# Patient Record
Sex: FEMALE | Race: WHITE | NOT HISPANIC OR LATINO
[De-identification: names, ages, dates, MRNs, and addresses within clinical notes are randomized per-mention and may not be internally consistent; named-entity substitution may affect disease eponyms.]

---

## 2019-01-18 ENCOUNTER — RESULT REVIEW (OUTPATIENT)
Age: 54
End: 2019-01-18

## 2019-08-06 ENCOUNTER — RX RENEWAL (OUTPATIENT)
Age: 54
End: 2019-08-06

## 2019-08-06 PROBLEM — Z00.00 ENCOUNTER FOR PREVENTIVE HEALTH EXAMINATION: Status: ACTIVE | Noted: 2019-08-06

## 2019-08-22 ENCOUNTER — RX RENEWAL (OUTPATIENT)
Age: 54
End: 2019-08-22

## 2019-09-12 ENCOUNTER — APPOINTMENT (OUTPATIENT)
Dept: ORTHOPEDIC SURGERY | Facility: CLINIC | Age: 54
End: 2019-09-12
Payer: COMMERCIAL

## 2019-09-12 VITALS — WEIGHT: 181 LBS | HEIGHT: 65.5 IN | BODY MASS INDEX: 29.79 KG/M2

## 2019-09-12 PROCEDURE — 20610 DRAIN/INJ JOINT/BURSA W/O US: CPT | Mod: LT

## 2019-09-12 PROCEDURE — 99212 OFFICE O/P EST SF 10 MIN: CPT | Mod: 25

## 2020-01-28 ENCOUNTER — APPOINTMENT (OUTPATIENT)
Dept: ORTHOPEDIC SURGERY | Facility: CLINIC | Age: 55
End: 2020-01-28
Payer: COMMERCIAL

## 2020-01-28 VITALS — BODY MASS INDEX: 30.62 KG/M2 | WEIGHT: 186 LBS | HEIGHT: 65.5 IN

## 2020-01-28 PROCEDURE — 99214 OFFICE O/P EST MOD 30 MIN: CPT

## 2020-01-28 PROCEDURE — 73030 X-RAY EXAM OF SHOULDER: CPT | Mod: RT

## 2020-01-28 PROCEDURE — 73070 X-RAY EXAM OF ELBOW: CPT | Mod: RT

## 2020-01-28 RX ORDER — VALACYCLOVIR 500 MG/1
500 TABLET, FILM COATED ORAL
Qty: 90 | Refills: 0 | Status: ACTIVE | COMMUNITY
Start: 2019-12-15

## 2020-01-29 NOTE — ASSESSMENT
[FreeTextEntry1] : 54 year old female with right rotator cuff tendinitis. She was given a printout for exercises.  She was given a prescription for Vimovo (she has taken in the past without heartburn).  She will followup in 3 weeks.  She knows to call with any questions or concerns or if her symptoms acutely worsen.

## 2020-01-29 NOTE — HISTORY OF PRESENT ILLNESS
[de-identified] : 54 year old female with right shoulder pain radiating down her arm to just proximal to her elbow. She cannot recall an inciting event.  The pain bothers her at night. She denies any numbness or tingling.  She has pain with motion of the shoulder.   She has tried NSAIDs without relief.

## 2020-01-29 NOTE — PHYSICAL EXAM
[de-identified] : General: No acute distress, conversant, well-nourished.\par Head: Normocephalic, atraumatic\par Neck: trachea midline, FROM\par Heart: normotensive and normal rate and rhythm\par Lungs: No labored breathing\par Skin: No abrasions, no rashes, no edema\par Psych: Alert and oriented to person, place and time\par Extremities: no peripheral edema or digital cyanosis\par Gait: Normal gait. Can perform tandem gait.  \par Vascular: warm and well perfused distally, palpable distal pulses\par \par Right Shoulder Exam:\par No swelling, no erythema.  \par No tenderness to palpation. \par Pain with active ROM of shoulder\par \par Positive Neer's impingement sign\par Positive Hawkin's test\par Positive Erik's test\par Good strength with shoulder ER and IR.\par \par No tenderness at bicipital groove\par Negative Speed's test\par Negative Yergson's test\par \par Negative Cross-body Adduction\par Negative Clackamas's test\par \par Right Elbow\par no tenderness to palpation\par pain in shoulder and upper arm with full extension, no pain with flexion or supination or pronation\par \par Sensation intact to light touch axillary, medial and lateral antebrachial cutaneous, median, radial and ulnar distributions\par +motor deltoid, biceps, triceps, EPL, FDP and IO\par palpable radial pulse, WWP distally [de-identified] : Right elbow AP and Lat radiographs obtained in the office today shows no fracture or dislocation.  \par \par Right shoulder radiographs obtained in the office today shows no fracture or dislocation.

## 2020-02-18 ENCOUNTER — APPOINTMENT (OUTPATIENT)
Dept: ORTHOPEDIC SURGERY | Facility: CLINIC | Age: 55
End: 2020-02-18
Payer: COMMERCIAL

## 2020-02-18 DIAGNOSIS — M25.521 PAIN IN RIGHT ELBOW: ICD-10-CM

## 2020-02-18 DIAGNOSIS — M75.81 OTHER SHOULDER LESIONS, RIGHT SHOULDER: ICD-10-CM

## 2020-02-18 PROCEDURE — 99213 OFFICE O/P EST LOW 20 MIN: CPT

## 2020-02-18 NOTE — PHYSICAL EXAM
[de-identified] : General: No acute distress, conversant, well-nourished.\par Head: Normocephalic, atraumatic\par Neck: trachea midline, FROM\par Heart: normotensive and normal rate and rhythm\par Lungs: No labored breathing\par Skin: No abrasions, no rashes, no edema\par Psych: Alert and oriented to person, place and time\par Extremities: no peripheral edema or digital cyanosis\par Gait: Normal gait. Can perform tandem gait.  \par Vascular: warm and well perfused distally, palpable distal pulses\par \par Right Shoulder Exam:\par No swelling, no erythema.  \par No tenderness to palpation. \par No pain with active ROM\par \par Negative Neer's impingement sign\par Negative Hawkin's test\par Negative Erik's test\par Good strength with shoulder ER and IR.\par \par No tenderness at bicipital groove\par Negative Speed's test\par Negative Yergson's test\par \par Negative Cross-body Adduction\par Negative Haakon's test\par \par Sensation intact to light touch axillary, medial and lateral antebrachial cutaneous, median, radial and ulnar distributions\par +motor deltoid, biceps, triceps, EPL, FDP and IO\par palpable radial pulse, WWP distally

## 2020-02-18 NOTE — ASSESSMENT
[FreeTextEntry1] : 54 year old female returns for followup for R shoulder and upper arm pain.  Her pain has completely resolved.  She is happy with her result.  She is off pain medication.  She will followup as needed.  She knows to call with any questions or concerns or if her symptoms acutely worsen.

## 2020-02-18 NOTE — HISTORY OF PRESENT ILLNESS
[de-identified] : 54 year old female returns for followup for Right shoulder and upper arm pain.  She says the pain has resolved. She still has some stiffness in that shoulder but she says that is the baseline for her shoulder.  She is off the anti-inflammatory medication.  She is happy with her progress.

## 2020-05-20 ENCOUNTER — RESULT REVIEW (OUTPATIENT)
Age: 55
End: 2020-05-20

## 2021-01-11 ENCOUNTER — APPOINTMENT (OUTPATIENT)
Dept: NEUROLOGY | Facility: CLINIC | Age: 56
End: 2021-01-11
Payer: COMMERCIAL

## 2021-01-11 VITALS
SYSTOLIC BLOOD PRESSURE: 106 MMHG | TEMPERATURE: 97.5 F | DIASTOLIC BLOOD PRESSURE: 72 MMHG | HEIGHT: 65.5 IN | WEIGHT: 183 LBS | HEART RATE: 65 BPM | BODY MASS INDEX: 30.12 KG/M2

## 2021-01-11 DIAGNOSIS — Z78.9 OTHER SPECIFIED HEALTH STATUS: ICD-10-CM

## 2021-01-11 DIAGNOSIS — Z86.59 PERSONAL HISTORY OF OTHER MENTAL AND BEHAVIORAL DISORDERS: ICD-10-CM

## 2021-01-11 DIAGNOSIS — R29.6 REPEATED FALLS: ICD-10-CM

## 2021-01-11 PROCEDURE — 99204 OFFICE O/P NEW MOD 45 MIN: CPT

## 2021-01-11 PROCEDURE — 99072 ADDL SUPL MATRL&STAF TM PHE: CPT

## 2021-01-11 RX ORDER — DIAZEPAM 5 MG/1
5 TABLET ORAL
Qty: 60 | Refills: 0 | Status: ACTIVE | COMMUNITY
Start: 2020-12-14

## 2021-01-11 NOTE — HISTORY OF PRESENT ILLNESS
[FreeTextEntry1] : This is a 55-year-old right-handed woman who is being seen in neurologic consultation or complaints of migraines as well as cognitive issues.\par Patient has had migraines for the last 20 years. Initially they started out as auras without headache. Typically she describes seeing a flashing "crown" in her visual field in either or both eyes. About 15-20 minutes later she will have a headache. The headache can last all day but can also stay for 2-3 days. She has tried extra strength Tylenol. In the past she has tried butalbital which seems to be more helpful than Tylenol. She cannot tolerate ibuprofen or naproxen because it bothers her stomach. In the past Imitrex caused her to have chest tightness and jaw tightness.\par Triggers include red wine. Often times she can have a migraine after a prolonged period of stress- "let down" type of migraines. She does not have migraines regularly. However, she can have migraine episodes that will be more frequent during a set period of time.\par She does have chronic tension headaches. These are different from her migraines. Associated neck and shoulder tension is noted. In the past that was triggered by chewing, and grinding her teeth.\par \par She has been noticing that she forgets names of people she works with. This is embarrassing for her. She is an . At work no one has noticed that she has made any errors. Long-term memory is intact. Executive decision-making is intact. She does not get lost when she drives.\par \par Since she has been wearing a mask she notices that she has had several falls. She feels that her glasses did not stick well on her nose because of the mask and this can lead to the falls. She has no neck pain. No bowel or bladder incontinence. No numbness or weakness in her legs.\par \par She may have interstitial cystitis and notes a lot of bladder spasms and the need to wake up frequently to go to the bathroom at night. She has to see a urologist for this.\par \par She says she sleeps well overall but this may not be consistent. She does drink a lot of water. Has been focusing on her diet.\par \par Her cardiologist obtain MRA of the head which is normal. There was some concern about fibromuscular dysplasia.

## 2021-01-11 NOTE — CONSULT LETTER
[Dear  ___] : Dear  [unfilled], [Consult Letter:] : I had the pleasure of evaluating your patient, [unfilled]. [Please see my note below.] : Please see my note below. [FreeTextEntry3] : Sincerely,\par \par Karli Witt M.D.\par

## 2021-01-11 NOTE — PHYSICAL EXAM
[FreeTextEntry1] : Physical examination \par General: No acute distress, Awake, Alert. \par Fundus: Unable\par Neck: no Carotid bruit. \par Cardiovascular: Normal rate, Regular rhythm, No murmur. \par Chest - clear bilaterally\par \par Mental status \par Awake, alert, and oriented to person, time and place, Normal attention span and concentration, Recent and remote memory intact, Language intact, Fund of knowledge intact. \par Cranial Nerves \par II: VFF \par III, IV, VI: PERRL, EOMI. \par V: Facial sensation is normal B/L. \par VII: Facial strength is normal B/L. \par VIII: Gross hearing is intact. \par IX, X: Palate is midline and elevates symmetrically. \par XI: Trapezius normal strength. \par XII: Tongue midline without atrophy or fasciculations. \par \par Motor exam \par Muscle tone - no evidence of rigidity or resistance in all 4 extremities. \par No atrophy or fasciculations \par Muscle Strength: arms and legs, proximal and distal flexors and extensors are normal \par No UE drift.\par \par Reflexes \par All present, normal, and symmetrical. \par \par Coordination \par Finger to nose: Normal. \par Heel to shin: Normal. \par \par Sensory \par Intact sensation to vibration and cold.\par \par Gait \par Normal\par

## 2021-01-11 NOTE — ASSESSMENT
[FreeTextEntry1] : Neurologic examination is normal. However, she is having increased number of headaches along with falls. I will get MRI of the brain noncontrast to evaluate for any structural abnormality. She is also having problems with memory specifically recalling names. She will get a neuropsychological evaluation.\par She is willing to try low-dose amitriptyline to help her with her migraines and tension-type headaches. This will also help her sleep better. There have been reports of amitriptyline helping with bladder spasms and interstitial cystitis.\par She notes perhaps a mild problem with hearing but we will address this at followup. We may need to consider an ideology evaluation.

## 2021-02-11 ENCOUNTER — RESULT REVIEW (OUTPATIENT)
Age: 56
End: 2021-02-11

## 2021-03-15 ENCOUNTER — APPOINTMENT (OUTPATIENT)
Dept: NEUROLOGY | Facility: CLINIC | Age: 56
End: 2021-03-15
Payer: COMMERCIAL

## 2021-03-15 VITALS
HEART RATE: 70 BPM | WEIGHT: 185 LBS | BODY MASS INDEX: 30.45 KG/M2 | SYSTOLIC BLOOD PRESSURE: 111 MMHG | DIASTOLIC BLOOD PRESSURE: 79 MMHG | TEMPERATURE: 97.3 F | HEIGHT: 65.5 IN

## 2021-03-15 PROCEDURE — 99072 ADDL SUPL MATRL&STAF TM PHE: CPT

## 2021-03-15 PROCEDURE — 99214 OFFICE O/P EST MOD 30 MIN: CPT

## 2021-03-15 RX ORDER — HYALURONATE SOD, CROSS-LINKED 30 MG/3 ML
30 SYRINGE (ML) INTRAARTICULAR
Qty: 1 | Refills: 0 | Status: DISCONTINUED | OUTPATIENT
Start: 2019-08-22 | End: 2021-03-15

## 2021-03-15 RX ORDER — HYALURONATE SODIUM, STABILIZED 88 MG/4 ML
88 SYRINGE (ML) INTRAARTICULAR
Qty: 1 | Refills: 0 | Status: DISCONTINUED | OUTPATIENT
Start: 2020-04-06 | End: 2021-03-15

## 2021-03-15 RX ORDER — NAPROXEN AND ESOMEPRAZOLE MAGNESIUM 375; 20 MG/1; MG/1
375-20 TABLET, DELAYED RELEASE ORAL TWICE DAILY
Qty: 60 | Refills: 0 | Status: DISCONTINUED | COMMUNITY
Start: 2020-01-28 | End: 2021-03-15

## 2021-03-15 NOTE — HISTORY OF PRESENT ILLNESS
[FreeTextEntry1] : 3/15/21\par Patient is here in followup. She is reporting improvement in her tension-type headache closely. Since the last visit she thinks she has had maybe 5 migraines. The amitriptyline has also helped with her bladder spasms. For the breakthrough migraines she has taken extra strength Tylenol and had some left over butalbital.\par \par In the past she has tried Imitrex and Maxalt which causes palpitations and chest tightness. She is very uncomfortable and does not want to use these medications.\par \jenny Continues to have memory issues. She is a  and notes no issues with her work. Did not do memory test.\par \par 1/11/21\par This is a 55-year-old right-handed woman who is being seen in neurologic consultation or complaints of migraines as well as cognitive issues.\par Patient has had migraines for the last 20 years. Initially they started out as auras without headache. Typically she describes seeing a flashing "crown" in her visual field in either or both eyes. About 15-20 minutes later she will have a headache. The headache can last all day but can also stay for 2-3 days. She has tried extra strength Tylenol. In the past she has tried butalbital which seems to be more helpful than Tylenol. She cannot tolerate ibuprofen or naproxen because it bothers her stomach. In the past Imitrex caused her to have chest tightness and jaw tightness.\par Triggers include red wine. Often times she can have a migraine after a prolonged period of stress- "let down" type of migraines. She does not have migraines regularly. However, she can have migraine episodes that will be more frequent during a set period of time.\par She does have chronic tension headaches. These are different from her migraines. Associated neck and shoulder tension is noted. In the past that was triggered by chewing, and grinding her teeth.\par \par She has been noticing that she forgets names of people she works with. This is embarrassing for her. She is an . At work no one has noticed that she has made any errors. Long-term memory is intact. Executive decision-making is intact. She does not get lost when she drives.\par \par Since she has been wearing a mask she notices that she has had several falls. She feels that her glasses did not stick well on her nose because of the mask and this can lead to the falls. She has no neck pain. No bowel or bladder incontinence. No numbness or weakness in her legs.\par \par She may have interstitial cystitis and notes a lot of bladder spasms and the need to wake up frequently to go to the bathroom at night. She has to see a urologist for this.\par \par She says she sleeps well overall but this may not be consistent. She does drink a lot of water. Has been focusing on her diet.\par \par Her cardiologist obtain MRA of the head which is normal. There was some concern about fibromuscular dysplasia.

## 2021-03-15 NOTE — PHYSICAL EXAM
[FreeTextEntry1] : Physical examination \par General: No acute distress, Awake, Alert. \par Cardiovascular: Normal rate, Regular rhythm, No murmur. \par Chest - clear bilaterally\par \par Mental status \par Awake, alert, and oriented to person, time and place, Normal attention span and concentration, Recent and remote memory intact, Language intact, Fund of knowledge intact. \par Cranial Nerves \par II: VFF \par III, IV, VI: PERRL, EOMI. \par V: Facial sensation is normal B/L. \par VII: Facial strength is normal B/L. \par VIII: Gross hearing is intact. \par IX, X: Palate is midline and elevates symmetrically. \par XI: Trapezius normal strength. \par XII: Tongue midline without atrophy or fasciculations. \par \par Motor exam \par Muscle tone - no evidence of rigidity or resistance in all 4 extremities. \par No atrophy or fasciculations \par Muscle Strength: arms and legs, proximal and distal flexors and extensors are normal \par No UE drift.\par \par Reflexes \par All present, normal, and symmetrical. \par \par Gait \par Normal\par

## 2021-03-15 NOTE — REASON FOR VISIT
[Follow-Up: _____] : a [unfilled] follow-up visit [Consultation] : a consultation visit [FreeTextEntry1] : Migrans

## 2021-03-15 NOTE — ASSESSMENT
[FreeTextEntry1] : Neurologic examination remains normal. Her response to amitriptyline is encouraging. I will increase this to 30 mg over the next 2 weeks.\par Discussed side effects of dry mouth dry eyes.\par For breakthrough pain, she will try Ubrelvy.\par Reviewed MRI of the brain results with her in detail.\par She has not gotten her a neuro-psychological assessment and will do so now.\par Asked her to get me MRA results from other doctor (?fibromuscular dysplasia)\par \par She will keep a headache diary.\par \par Followup in 3 months.

## 2021-06-10 ENCOUNTER — RESULT REVIEW (OUTPATIENT)
Age: 56
End: 2021-06-10

## 2021-06-21 DIAGNOSIS — M17.12 UNILATERAL PRIMARY OSTEOARTHRITIS, LEFT KNEE: ICD-10-CM

## 2021-08-24 ENCOUNTER — APPOINTMENT (OUTPATIENT)
Dept: NEUROLOGY | Facility: CLINIC | Age: 56
End: 2021-08-24
Payer: COMMERCIAL

## 2021-08-24 VITALS
WEIGHT: 186 LBS | HEIGHT: 65.5 IN | SYSTOLIC BLOOD PRESSURE: 113 MMHG | HEART RATE: 59 BPM | DIASTOLIC BLOOD PRESSURE: 79 MMHG | TEMPERATURE: 97.2 F | BODY MASS INDEX: 30.62 KG/M2

## 2021-08-24 PROCEDURE — 99214 OFFICE O/P EST MOD 30 MIN: CPT

## 2021-08-24 NOTE — ASSESSMENT
[FreeTextEntry1] : Neurologic examination remains normal.\par I will switch her to nortriptyline to see if she has less cognitive side effects.\par She will continue Ubrelvy for the breakthrough pain.\par \par She still has not gotten her neuropsychological assessment.\par \par Begin Migrelief\par \par MRA report still needed\par \par She will keep a headache diary.\par \par Followup in 3 months.

## 2021-08-24 NOTE — HISTORY OF PRESENT ILLNESS
[FreeTextEntry1] : 8/24/21\par Here in follow-up.  Had increased amitriptyline up to 30 mg.  She decreased it further because she was too sleepy and cognitively impaired.  Currently she is taking 5 mg a day.  It was helping her bladder spasms as well as headaches.  At present she is afraid to increase it because of the cognitive problems.  She is really happy with Ubrelvy as there are no side effects and it works most of the time.  She is having some increased number of headaches since she stopped the amitriptyline.  She brought a headache diary.  At present her approximate headache log is about 15 headaches since last visit.\par \par 3/15/21\par Patient is here in followup. She is reporting improvement in her tension-type headache closely. Since the last visit she thinks she has had maybe 5 migraines. The amitriptyline has also helped with her bladder spasms. For the breakthrough migraines she has taken extra strength Tylenol and had some left over butalbital.\par \par In the past she has tried Imitrex and Maxalt which causes palpitations and chest tightness. She is very uncomfortable and does not want to use these medications.\par \jenny Continues to have memory issues. She is a  and notes no issues with her work. Did not do memory test.\par \par 1/11/21\par This is a 55-year-old right-handed woman who is being seen in neurologic consultation or complaints of migraines as well as cognitive issues.\par Patient has had migraines for the last 20 years. Initially they started out as auras without headache. Typically she describes seeing a flashing "crown" in her visual field in either or both eyes. About 15-20 minutes later she will have a headache. The headache can last all day but can also stay for 2-3 days. She has tried extra strength Tylenol. In the past she has tried butalbital which seems to be more helpful than Tylenol. She cannot tolerate ibuprofen or naproxen because it bothers her stomach. In the past Imitrex caused her to have chest tightness and jaw tightness.\par Triggers include red wine. Often times she can have a migraine after a prolonged period of stress- "let down" type of migraines. She does not have migraines regularly. However, she can have migraine episodes that will be more frequent during a set period of time.\par She does have chronic tension headaches. These are different from her migraines. Associated neck and shoulder tension is noted. In the past that was triggered by chewing, and grinding her teeth.\par \par She has been noticing that she forgets names of people she works with. This is embarrassing for her. She is an . At work no one has noticed that she has made any errors. Long-term memory is intact. Executive decision-making is intact. She does not get lost when she drives.\par \par Since she has been wearing a mask she notices that she has had several falls. She feels that her glasses did not stick well on her nose because of the mask and this can lead to the falls. She has no neck pain. No bowel or bladder incontinence. No numbness or weakness in her legs.\par \par She may have interstitial cystitis and notes a lot of bladder spasms and the need to wake up frequently to go to the bathroom at night. She has to see a urologist for this.\par \par She says she sleeps well overall but this may not be consistent. She does drink a lot of water. Has been focusing on her diet.\par \par Her cardiologist obtain MRA of the head which is normal. There was some concern about fibromuscular dysplasia.

## 2021-12-14 ENCOUNTER — APPOINTMENT (OUTPATIENT)
Dept: NEUROLOGY | Facility: CLINIC | Age: 56
End: 2021-12-14
Payer: COMMERCIAL

## 2021-12-14 VITALS — DIASTOLIC BLOOD PRESSURE: 76 MMHG | HEART RATE: 63 BPM | SYSTOLIC BLOOD PRESSURE: 116 MMHG

## 2021-12-14 DIAGNOSIS — M25.522 PAIN IN LEFT ELBOW: ICD-10-CM

## 2021-12-14 PROCEDURE — 99215 OFFICE O/P EST HI 40 MIN: CPT

## 2021-12-14 RX ORDER — AMITRIPTYLINE HYDROCHLORIDE 10 MG/1
10 TABLET, FILM COATED ORAL
Qty: 90 | Refills: 3 | Status: DISCONTINUED | COMMUNITY
Start: 2021-01-11 | End: 2021-12-14

## 2021-12-14 RX ORDER — FAMOTIDINE 10 MG/1
TABLET, FILM COATED ORAL
Refills: 0 | Status: ACTIVE | COMMUNITY

## 2021-12-14 RX ORDER — HYALURONATE SODIUM, STABILIZED 88 MG/4 ML
88 SYRINGE (ML) INTRAARTICULAR
Qty: 1 | Refills: 0 | Status: DISCONTINUED | OUTPATIENT
Start: 2019-08-06 | End: 2021-12-14

## 2021-12-14 RX ORDER — HYALURONATE SODIUM, STABILIZED 88 MG/4 ML
88 SYRINGE (ML) INTRAARTICULAR
Qty: 1 | Refills: 0 | Status: DISCONTINUED | OUTPATIENT
Start: 2021-06-16 | End: 2021-12-14

## 2021-12-14 RX ORDER — HYALURONATE SODIUM, STABILIZED 88 MG/4 ML
88 SYRINGE (ML) INTRAARTICULAR
Qty: 1 | Refills: 0 | Status: DISCONTINUED | OUTPATIENT
Start: 2021-06-21 | End: 2021-12-14

## 2021-12-14 RX ORDER — BACILLUS COAGULANS/INULIN 1B-250 MG
CAPSULE ORAL
Refills: 0 | Status: ACTIVE | COMMUNITY

## 2021-12-14 NOTE — PHYSICAL EXAM
[FreeTextEntry1] : Physical examination \par General: No acute distress, Awake, Alert. \par  other: trapezius spasm.\par Negative Lhermitte's sign.\par \par Mental status \par Awake, alert, gives detailed history. \par Cranial Nerves \par II: VFF \par III, IV, VI: PERRL, EOMI. \par V: Facial sensation is normal B/L. \par VII: Facial strength is normal B/L. \par VIII: Gross hearing is intact. \par IX, X: Palate is midline and elevates symmetrically. \par XI: Trapezius normal strength. \par XII: Tongue midline without atrophy or fasciculations. \par \par Motor exam \par Muscle tone - no evidence of rigidity or resistance in all 4 extremities. \par No atrophy or fasciculations \par Muscle Strength: arms and legs, proximal and distal flexors and extensors are normal \par No UE drift.\par \par Reflexes \par Diffuse hyperreflexia. \par \par Sensory:\par Positive Tinels B.\par Decreased PP sparing the fourth and fifth digit B. \par \par Gait \par Normal\par

## 2021-12-14 NOTE — CONSULT LETTER
[Dear  ___] : Dear  [unfilled], [Consult Letter:] : I had the pleasure of evaluating your patient, [unfilled]. [Please see my note below.] : Please see my note below. [Consult Closing:] : Thank you very much for allowing me to participate in the care of this patient.  If you have any questions, please do not hesitate to contact me. [Sincerely,] : Sincerely, [FreeTextEntry3] : Sincerely,\par Harriet Lind NBrainP.\par \par

## 2021-12-14 NOTE — HISTORY OF PRESENT ILLNESS
[FreeTextEntry1] : Kathe Ahumada is a 56 year old woman presenting for a follow up appointment for migraines and tension type headaches. \par \par She has not had any more than 3 headache days per month. no migraines in past month. She took Ubrelvy 50mg with repeat of one dose at times and it works effectively. Denies nausea. Has aura of "crown" then headache in various areas afterward (unchanged from previously).\par \par Taking Nortriptyline 10mg - is having a BM a few times per week. Have not tried magnesium oxide yet as per PCP. She is taking magnesium citrate with calcium. \par Increasing irritable past few weeks possibly due to stress. She didn't get neuropsychological testing yet. She could not remember remembering conversation at work. \par In the last 2 months could not recall conversations at work at times. She can pay her bills-forget at times, make lists for grocery shopping. Did not have neuropsychological testing. \par \par She endorses a transient headache behind her right eye that started on November 9th. This lasted a few minutes. She had increase stress with her family at that time. \par She reports her headache started with her head flexing neck back and has tightness in her neck.  Declined PT. \par \par Feels generalized weak and fatigued-takes nortriptyline in AM.\par \par She has a torn meniscus. Was taking NSAIDS extra sensitiv seeing GI, esophageal discomfort. \par \par Left wrist pain radiating into thumb and fingers. At times she has elbow pain radiating down to wrist and fourth finger. aching that started two weeks ago. Denies any injury. Hands more clumsy B. Denies weakness. \par \par Denies back pain radiating down legs. \par \par The remaining neurological review of systems is negative. \par \par 3/15/21\par Patient is here in followup. She is reporting improvement in her tension-type headache closely. Since the last visit she thinks she has had maybe 5 migraines. The amitriptyline has also helped with her bladder spasms. For the breakthrough migraines she has taken extra strength Tylenol and had some left over butalbital.\par \par In the past she has tried Imitrex and Maxalt which causes palpitations and chest tightness. She is very uncomfortable and does not want to use these medications.\par \par Continues to have memory issues. She is a  and notes no issues with her work. Did not do memory test.\par \par 1/11/21\par This is a 55-year-old right-handed woman who is being seen in neurologic consultation or complaints of migraines as well as cognitive issues.\par Patient has had migraines for the last 20 years. Initially they started out as auras without headache. Typically she describes seeing a flashing "crown" in her visual field in either or both eyes. About 15-20 minutes later she will have a headache. The headache can last all day but can also stay for 2-3 days. She has tried extra strength Tylenol. In the past she has tried butalbital which seems to be more helpful than Tylenol. She cannot tolerate ibuprofen or naproxen because it bothers her stomach. In the past Imitrex caused her to have chest tightness and jaw tightness.\par Triggers include red wine. Often times she can have a migraine after a prolonged period of stress- "let down" type of migraines. She does not have migraines regularly. However, she can have migraine episodes that will be more frequent during a set period of time.\par She does have chronic tension headaches. These are different from her migraines. Associated neck and shoulder tension is noted. In the past that was triggered by chewing, and grinding her teeth.\par \par She has been noticing that she forgets names of people she works with. This is embarrassing for her. She is an . At work no one has noticed that she has made any errors. Long-term memory is intact. Executive decision-making is intact. She does not get lost when she drives.\par \par Since she has been wearing a mask she notices that she has had several falls. She feels that her glasses did not stick well on her nose because of the mask and this can lead to the falls. She has no neck pain. No bowel or bladder incontinence. No numbness or weakness in her legs.\par \par She may have interstitial cystitis and notes a lot of bladder spasms and the need to wake up frequently to go to the bathroom at night. She has to see a urologist for this.\par \par She says she sleeps well overall but this may not be consistent. She does drink a lot of water. Has been focusing on her diet.\par \par Her cardiologist obtain MRA of the head which is normal. There was some concern about fibromuscular dysplasia.

## 2021-12-14 NOTE — ASSESSMENT
[FreeTextEntry1] : Kathe Ahumada is a 56 year old woman with multiple issues. \par \par Migraine with aura and chronic tension type headaches. \par Continue Nortriptyline 10mg. Trial of switching dose to bedtime to see if fatigue and generalized weakness improves.  Add bowel regimen as per PCP. \par Discussed side effects of dry mouth dry eyes.\par Daytime fatigue, snoring, and cognitive changes- Home sleep apnea test. \par Hyperreflexia and normal neurological exam- consider MRI cervical spine next visit if exam or symptoms change.\par For breakthrough pain, she will continue Ubrelvy.\par Reviewed MRI of the brain results with her in detail.\par Keep headache diary.\par Increase water intake. \par \par Nonradiating neck pain- apply heat to neck 20 minutes three times daily.\par \par Cognitive complaints. \par She has not gotten her a neuro-psychological assessment and will do so now (order placed again).\par Asked her to get me MRA results from other doctor (?fibromuscular dysplasia)\par \par Paresthesias in hands and left elbow pain. \par Probable carpal tunnel syndrome and possible left ulnar neuropathy.\par Start OT. \par EMG arms.  \par \par Follow up in 3 months.\par \par Case and management discussed with Dr. Witt. \par \par I discussed in detail with the patient the diagnosis, prognosis, treatment plan and answered all of her questions.\par \par

## 2021-12-14 NOTE — DATA REVIEWED
[de-identified] : 2/11/21\par IMPRESSION:\par \par  No acute intracranial hemorrhage, acute infarction, extra-axial fluid collection or hydrocephalus.\par  Mild subcortical foci of white matter T2 weighted hyperintensity with a frontal lobe predominance,\par nonspecific in this age demographic may reflect sequela of mild chronic microvascular ischemia\par versus sequela of chronic migraine.\par

## 2021-12-20 ENCOUNTER — RX RENEWAL (OUTPATIENT)
Age: 56
End: 2021-12-20

## 2022-02-07 NOTE — REASON FOR VISIT
[Follow-Up: _____] : a [unfilled] follow-up visit [Consultation] : a consultation visit Detail Level: Detailed [FreeTextEntry1] : Migrans

## 2022-03-10 ENCOUNTER — APPOINTMENT (OUTPATIENT)
Dept: NEUROLOGY | Facility: CLINIC | Age: 57
End: 2022-03-10

## 2022-04-12 ENCOUNTER — RX RENEWAL (OUTPATIENT)
Age: 57
End: 2022-04-12

## 2022-05-03 ENCOUNTER — RESULT REVIEW (OUTPATIENT)
Age: 57
End: 2022-05-03

## 2022-06-01 ENCOUNTER — APPOINTMENT (OUTPATIENT)
Dept: NEUROLOGY | Facility: CLINIC | Age: 57
End: 2022-06-01
Payer: COMMERCIAL

## 2022-06-01 VITALS
HEIGHT: 66 IN | WEIGHT: 181 LBS | BODY MASS INDEX: 29.09 KG/M2 | SYSTOLIC BLOOD PRESSURE: 121 MMHG | OXYGEN SATURATION: 98 % | HEART RATE: 69 BPM | DIASTOLIC BLOOD PRESSURE: 82 MMHG | TEMPERATURE: 97.8 F

## 2022-06-01 DIAGNOSIS — G44.221 CHRONIC TENSION-TYPE HEADACHE, INTRACTABLE: ICD-10-CM

## 2022-06-01 DIAGNOSIS — R20.2 PARESTHESIA OF SKIN: ICD-10-CM

## 2022-06-01 PROCEDURE — 99214 OFFICE O/P EST MOD 30 MIN: CPT

## 2022-06-01 RX ORDER — COLLAGENASE CLOSTRIDIUM HIST.
POWDER (EA) MISCELLANEOUS
Refills: 0 | Status: DISCONTINUED | COMMUNITY
End: 2022-06-01

## 2022-06-02 NOTE — CONSULT LETTER
[Dear  ___] : Dear  [unfilled], [Consult Letter:] : I had the pleasure of evaluating your patient, [unfilled]. [Please see my note below.] : Please see my note below. [Consult Closing:] : Thank you very much for allowing me to participate in the care of this patient.  If you have any questions, please do not hesitate to contact me. [FreeTextEntry3] : Sincerely,\par \par Karli Witt M.D.\par \par

## 2022-06-02 NOTE — DATA REVIEWED
[de-identified] : 2/11/21\par IMPRESSION:\par \par  No acute intracranial hemorrhage, acute infarction, extra-axial fluid collection or hydrocephalus.\par  Mild subcortical foci of white matter T2 weighted hyperintensity with a frontal lobe predominance,\par nonspecific in this age demographic may reflect sequela of mild chronic microvascular ischemia\par versus sequela of chronic migraine.\par

## 2022-06-02 NOTE — ASSESSMENT
[FreeTextEntry1] : Kathe Ahumada is a 56 year old woman with multiple issues. \par \par Migraine with aura and chronic tension type headaches. \par Continue Nortriptyline 10mg.  \par \par Daytime fatigue, snoring, and cognitive changes- does not think she needs home sleep apnea test so not scheduling\par \par For breakthrough pain, she will continue Ubrelvy.\par \par Keep headache diary.\par Increase water intake. \par \par Nonradiating neck pain- apply heat to neck 20 minutes three times daily.\par \par Cognitive complaints. \par She has not gotten her a neuro-psychological assessment.\par \par \par Paresthesias in hands and left elbow pain. \par Probable carpal tunnel syndrome and possible left ulnar neuropathy.\par Cancelled EMG\par \par Asked her to get me MRA results from other doctor (?fibromuscular dysplasia)\par \par Follow up in 1 year\par \par

## 2022-06-02 NOTE — HISTORY OF PRESENT ILLNESS
[FreeTextEntry1] : Kathe is here in f/u. She notes that headaches are overall well controlled.\par Migraines are well controlled along with smaller tension headaches are well controlled.\par \par Chocolate, red wine, and blue cheese triggered a migraine this past week. Ubrelvy does work- she has to take one or two. Has not needed in several months.\par \par Weather can trigger sinus headaches.\par \par Feels that she does not need sleep study.\par did not do neuropsychological evaluation.\par \par She takes Nortriptyline in the am as at night it keeps her awake. Has some fatigue but not sure if its from Nortriptyline. Notes constipation is worse from baseline. Has f/u with PCP soon.\par \par Feels memory is better. \par Left wrist pain resolved- no more numbness or pain in the left hand-  Cancelled EMG as well. Thinks its overuse and weight lifting during work outs.\par \par Occasionally has split second episode of the environment shifts or "black out." No hearing loss or tinnitus. no chest pain or sob. Vision is fine. \par \par No significant neck pain\par Working from home helps.\par  \par 12/14/21\par Kathe Ahumada is a 56 year old woman presenting for a follow up appointment for migraines and tension type headaches. \par \par She has not had any more than 3 headache days per month. no migraines in past month. She took Ubrelvy 50mg with repeat of one dose at times and it works effectively. Denies nausea. Has aura of "crown" then headache in various areas afterward (unchanged from previously).\par \par Taking Nortriptyline 10mg - is having a BM a few times per week. Have not tried magnesium oxide yet as per PCP. She is taking magnesium citrate with calcium. \par Increasing irritable past few weeks possibly due to stress. She didn't get neuropsychological testing yet. She could not remember remembering conversation at work. \par In the last 2 months could not recall conversations at work at times. She can pay her bills-forget at times, make lists for grocery shopping. Did not have neuropsychological testing. \par \par She endorses a transient headache behind her right eye that started on November 9th. This lasted a few minutes. She had increase stress with her family at that time. \par She reports her headache started with her head flexing neck back and has tightness in her neck.  Declined PT. \par \par Feels generalized weak and fatigued-takes nortriptyline in AM.\par \par She has a torn meniscus. Was taking NSAIDS extra sensitiv seeing GI, esophageal discomfort. \par \par Left wrist pain radiating into thumb and fingers. At times she has elbow pain radiating down to wrist and fourth finger. aching that started two weeks ago. Denies any injury. Hands more clumsy B. Denies weakness. \par \par Denies back pain radiating down legs. \par \par The remaining neurological review of systems is negative. \par \par 3/15/21\par Patient is here in followup. She is reporting improvement in her tension-type headache closely. Since the last visit she thinks she has had maybe 5 migraines. The amitriptyline has also helped with her bladder spasms. For the breakthrough migraines she has taken extra strength Tylenol and had some left over butalbital.\par \par In the past she has tried Imitrex and Maxalt which causes palpitations and chest tightness. She is very uncomfortable and does not want to use these medications.\par \par Continues to have memory issues. She is a  and notes no issues with her work. Did not do memory test.\par \par 1/11/21\par This is a 55-year-old right-handed woman who is being seen in neurologic consultation or complaints of migraines as well as cognitive issues.\par Patient has had migraines for the last 20 years. Initially they started out as auras without headache. Typically she describes seeing a flashing "crown" in her visual field in either or both eyes. About 15-20 minutes later she will have a headache. The headache can last all day but can also stay for 2-3 days. She has tried extra strength Tylenol. In the past she has tried butalbital which seems to be more helpful than Tylenol. She cannot tolerate ibuprofen or naproxen because it bothers her stomach. In the past Imitrex caused her to have chest tightness and jaw tightness.\par Triggers include red wine. Often times she can have a migraine after a prolonged period of stress- "let down" type of migraines. She does not have migraines regularly. However, she can have migraine episodes that will be more frequent during a set period of time.\par She does have chronic tension headaches. These are different from her migraines. Associated neck and shoulder tension is noted. In the past that was triggered by chewing, and grinding her teeth.\par \par She has been noticing that she forgets names of people she works with. This is embarrassing for her. She is an . At work no one has noticed that she has made any errors. Long-term memory is intact. Executive decision-making is intact. She does not get lost when she drives.\par \par Since she has been wearing a mask she notices that she has had several falls. She feels that her glasses did not stick well on her nose because of the mask and this can lead to the falls. She has no neck pain. No bowel or bladder incontinence. No numbness or weakness in her legs.\par \par She may have interstitial cystitis and notes a lot of bladder spasms and the need to wake up frequently to go to the bathroom at night. She has to see a urologist for this.\par \par She says she sleeps well overall but this may not be consistent. She does drink a lot of water. Has been focusing on her diet.\par \par Her cardiologist obtain MRA of the head which is normal. There was some concern about fibromuscular dysplasia.

## 2022-06-02 NOTE — PHYSICAL EXAM
[FreeTextEntry1] : Physical examination \par General: No acute distress, Awake, Alert. \par  other: trapezius spasm\par \par Mental status \par Awake, alert, gives detailed history. \par Cranial Nerves \par II: VFF \par III, IV, VI: PERRL, EOMI. \par V: Facial sensation is normal B/L. \par VII: Facial strength is normal B/L. \par VIII: Gross hearing is intact. \par IX, X: Palate is midline and elevates symmetrically. \par XI: Trapezius normal strength. \par XII: Tongue midline without atrophy or fasciculations. \par \par Motor exam \par Muscle tone - no evidence of rigidity or resistance in all 4 extremities. \par No atrophy or fasciculations \par Muscle Strength: arms and legs, proximal and distal flexors and extensors are normal \par No UE drift.\par \par Reflexes \par Normal\par \par Sensory:\par Positive Tinels B.\par Decreased PP sparing the fourth and fifth digit B. \par \par Gait \par Normal\par

## 2024-03-11 ENCOUNTER — APPOINTMENT (OUTPATIENT)
Dept: NEUROLOGY | Facility: CLINIC | Age: 59
End: 2024-03-11
Payer: COMMERCIAL

## 2024-03-11 VITALS
DIASTOLIC BLOOD PRESSURE: 76 MMHG | BODY MASS INDEX: 28.12 KG/M2 | HEART RATE: 65 BPM | SYSTOLIC BLOOD PRESSURE: 111 MMHG | HEIGHT: 66 IN | OXYGEN SATURATION: 96 % | WEIGHT: 175 LBS

## 2024-03-11 DIAGNOSIS — R42 DIZZINESS AND GIDDINESS: ICD-10-CM

## 2024-03-11 DIAGNOSIS — R41.9 UNSPECIFIED SYMPTOMS AND SIGNS INVOLVING COGNITIVE FUNCTIONS AND AWARENESS: ICD-10-CM

## 2024-03-11 DIAGNOSIS — G43.109 MIGRAINE WITH AURA, NOT INTRACTABLE, W/OUT STATUS MIGRAINOSUS: ICD-10-CM

## 2024-03-11 PROCEDURE — 99214 OFFICE O/P EST MOD 30 MIN: CPT

## 2024-03-11 RX ORDER — NORTRIPTYLINE HYDROCHLORIDE 10 MG/1
10 CAPSULE ORAL
Qty: 90 | Refills: 3 | Status: DISCONTINUED | COMMUNITY
Start: 2021-08-24 | End: 2024-03-11

## 2024-03-11 NOTE — DATA REVIEWED
[de-identified] : 2/11/21\par  IMPRESSION:\par  \par   No acute intracranial hemorrhage, acute infarction, extra-axial fluid collection or hydrocephalus.\par   Mild subcortical foci of white matter T2 weighted hyperintensity with a frontal lobe predominance,\par  nonspecific in this age demographic may reflect sequela of mild chronic microvascular ischemia\par  versus sequela of chronic migraine.\par

## 2024-03-11 NOTE — ASSESSMENT
[FreeTextEntry1] : Kathe Ahumada is a 56 year old woman with multiple issues.   Cognitive complaints.  Imaging and neuropsychological evaluation.   Migraine with aura and chronic tension type headaches.  Off Nortriptyline  Increase rescue Ubrelvy to 100 mg daily prn.   Daytime fatigue, snoring, and cognitive changes- does not think she snores much.   For breakthrough pain, she will continue Ubrelvy.  Keep headache diary. Increase water intake.

## 2024-03-11 NOTE — HISTORY OF PRESENT ILLNESS
[FreeTextEntry1] : 3/11/24 Here in f/u. Headaches are overall well controlled. Does need more Ubrelvy for rescue- so 100 mg instead of 50 mg.  Average 2 per month  Has been noticing increased dizziness which she describes alternately as "going black" - no LOC and as balance problems. Lasts seconds.  Recently had tooth pulled for abscess  and is on course of antibiotics. Dx with anemia- treated with Iron and B12 shots.  Over last few months notes increased short term memory problems.  Able to work without issues Self aware Driving and managing tasks.  History of ADD. Has tried stimulants in past but notes increased "crankiness"- working with PNP now to find a medication that works. currently trying Evekeo. Easily overwhelmed with tasks from time to time.   Does not snore. Notes a lot of fatigue which is not new.  Post-menopausal.  6/1/22 Kathe is here in f/u. She notes that headaches are overall well controlled. Migraines are well controlled along with smaller tension headaches are well controlled.  Chocolate, red wine, and blue cheese triggered a migraine this past week. Ubrelvy does work- she has to take one or two. Has not needed in several months.  Weather can trigger sinus headaches.  Feels that she does not need sleep study. did not do neuropsychological evaluation.  She takes Nortriptyline in the am as at night it keeps her awake. Has some fatigue but not sure if its from Nortriptyline. Notes constipation is worse from baseline. Has f/u with PCP soon.  Feels memory is better.  Left wrist pain resolved- no more numbness or pain in the left hand-  Cancelled EMG as well. Thinks its overuse and weight lifting during work outs.  Occasionally has split second episode of the environment shifts or "black out." No hearing loss or tinnitus. no chest pain or sob. Vision is fine.   No significant neck pain Working from home helps.   12/14/21 Kathe Ahumada is a 56 year old woman presenting for a follow up appointment for migraines and tension type headaches.   She has not had any more than 3 headache days per month. no migraines in past month. She took Ubrelvy 50mg with repeat of one dose at times and it works effectively. Denies nausea. Has aura of "crown" then headache in various areas afterward (unchanged from previously).  Taking Nortriptyline 10mg - is having a BM a few times per week. Have not tried magnesium oxide yet as per PCP. She is taking magnesium citrate with calcium.  Increasing irritable past few weeks possibly due to stress. She didn't get neuropsychological testing yet. She could not remember remembering conversation at work.  In the last 2 months could not recall conversations at work at times. She can pay her bills-forget at times, make lists for grocery shopping. Did not have neuropsychological testing.   She endorses a transient headache behind her right eye that started on November 9th. This lasted a few minutes. She had increase stress with her family at that time.  She reports her headache started with her head flexing neck back and has tightness in her neck.  Declined PT.   Feels generalized weak and fatigued-takes nortriptyline in AM.  She has a torn meniscus. Was taking NSAIDS extra sensitiv seeing GI, esophageal discomfort.   Left wrist pain radiating into thumb and fingers. At times she has elbow pain radiating down to wrist and fourth finger. aching that started two weeks ago. Denies any injury. Hands more clumsy B. Denies weakness.   Denies back pain radiating down legs.   The remaining neurological review of systems is negative.   3/15/21 Patient is here in followup. She is reporting improvement in her tension-type headache closely. Since the last visit she thinks she has had maybe 5 migraines. The amitriptyline has also helped with her bladder spasms. For the breakthrough migraines she has taken extra strength Tylenol and had some left over butalbital.  In the past she has tried Imitrex and Maxalt which causes palpitations and chest tightness. She is very uncomfortable and does not want to use these medications.  Continues to have memory issues. She is a  and notes no issues with her work. Did not do memory test.  1/11/21 This is a 55-year-old right-handed woman who is being seen in neurologic consultation or complaints of migraines as well as cognitive issues. Patient has had migraines for the last 20 years. Initially they started out as auras without headache. Typically she describes seeing a flashing "crown" in her visual field in either or both eyes. About 15-20 minutes later she will have a headache. The headache can last all day but can also stay for 2-3 days. She has tried extra strength Tylenol. In the past she has tried butalbital which seems to be more helpful than Tylenol. She cannot tolerate ibuprofen or naproxen because it bothers her stomach. In the past Imitrex caused her to have chest tightness and jaw tightness. Triggers include red wine. Often times she can have a migraine after a prolonged period of stress- "let down" type of migraines. She does not have migraines regularly. However, she can have migraine episodes that will be more frequent during a set period of time. She does have chronic tension headaches. These are different from her migraines. Associated neck and shoulder tension is noted. In the past that was triggered by chewing, and grinding her teeth.  She has been noticing that she forgets names of people she works with. This is embarrassing for her. She is an . At work no one has noticed that she has made any errors. Long-term memory is intact. Executive decision-making is intact. She does not get lost when she drives.  Since she has been wearing a mask she notices that she has had several falls. She feels that her glasses did not stick well on her nose because of the mask and this can lead to the falls. She has no neck pain. No bowel or bladder incontinence. No numbness or weakness in her legs.  She may have interstitial cystitis and notes a lot of bladder spasms and the need to wake up frequently to go to the bathroom at night. She has to see a urologist for this.  She says she sleeps well overall but this may not be consistent. She does drink a lot of water. Has been focusing on her diet.  Her cardiologist obtain MRA of the head which is normal. There was some concern about fibromuscular dysplasia.

## 2024-03-12 RX ORDER — UBROGEPANT 100 MG/1
100 TABLET ORAL
Qty: 15 | Refills: 2 | Status: ACTIVE | COMMUNITY
Start: 2021-03-15 | End: 1900-01-01

## 2025-02-15 ENCOUNTER — RESULT REVIEW (OUTPATIENT)
Age: 60
End: 2025-02-15